# Patient Record
Sex: FEMALE | URBAN - METROPOLITAN AREA
[De-identification: names, ages, dates, MRNs, and addresses within clinical notes are randomized per-mention and may not be internally consistent; named-entity substitution may affect disease eponyms.]

---

## 2018-11-14 ENCOUNTER — IMPORTED ENCOUNTER (OUTPATIENT)
Dept: URBAN - METROPOLITAN AREA CLINIC 9 | Facility: CLINIC | Age: 43
End: 2018-11-14

## 2021-10-15 ASSESSMENT — VISUAL ACUITY
OD_SC: 20/20 SN
OS_SC: 20/20 SN

## 2021-10-15 ASSESSMENT — TONOMETRY
OS_IOP_MMHG: 17
OD_IOP_MMHG: 14

## 2022-03-09 NOTE — PATIENT DISCUSSION
Patient to discuss with  regarding cataract surgery.  Will refer when directed to Dr. Whitley Schuler per pt request.

## 2022-06-21 RX ORDER — ATORVASTATIN CALCIUM 20 MG/1
TABLET, FILM COATED ORAL
COMMUNITY

## 2022-07-27 NOTE — PATIENT DISCUSSION
Patient to discuss with  regarding cataract surgery.  Will refer when directed to Dr. Silvio Membreno per pt request.

## 2022-09-24 PROBLEM — R63.5 WEIGHT GAIN: Status: ACTIVE | Noted: 2022-09-24

## 2022-09-24 PROBLEM — F41.9 ANXIETY: Status: ACTIVE | Noted: 2022-09-24

## 2022-09-24 PROBLEM — Z90.710 S/P LAPAROSCOPIC ASSISTED VAGINAL HYSTERECTOMY (LAVH): Status: ACTIVE | Noted: 2022-09-24

## 2022-09-24 PROBLEM — R03.0 ELEVATED BLOOD PRESSURE READING WITHOUT DIAGNOSIS OF HYPERTENSION: Status: ACTIVE | Noted: 2022-09-24

## 2022-09-24 PROBLEM — G47.9 DIFFICULTY SLEEPING: Status: ACTIVE | Noted: 2022-09-24

## 2022-09-24 PROBLEM — E78.5 DYSLIPIDEMIA: Status: ACTIVE | Noted: 2022-09-24

## 2022-09-24 PROBLEM — I25.10 CORONARY ARTERY DISEASE INVOLVING NATIVE CORONARY ARTERY OF NATIVE HEART WITHOUT ANGINA PECTORIS: Status: ACTIVE | Noted: 2022-09-24

## 2022-09-24 PROBLEM — K51.90 ULCERATIVE COLITIS (HCC): Status: ACTIVE | Noted: 2022-09-24

## 2022-09-24 PROBLEM — E07.9 THYROID DISORDER: Status: ACTIVE | Noted: 2022-09-24

## 2022-09-24 PROBLEM — B37.31 VAGINAL MONILIASIS: Status: ACTIVE | Noted: 2022-09-24

## 2022-09-26 PROBLEM — R03.0 ELEVATED BLOOD PRESSURE READING WITHOUT DIAGNOSIS OF HYPERTENSION: Chronic | Status: ACTIVE | Noted: 2022-09-24

## 2022-09-26 PROBLEM — I25.10 CAD (CORONARY ARTERY DISEASE): Chronic | Status: ACTIVE | Noted: 2022-09-24

## 2022-09-26 PROBLEM — G47.9 DIFFICULTY SLEEPING: Chronic | Status: ACTIVE | Noted: 2022-09-24

## 2022-09-26 PROBLEM — R23.4 CRACKED SKIN ON FEET: Status: ACTIVE | Noted: 2022-09-26

## 2022-09-26 PROBLEM — E78.5 DYSLIPIDEMIA: Chronic | Status: ACTIVE | Noted: 2022-09-24

## 2022-09-26 PROBLEM — K51.90 ULCERATIVE COLITIS (HCC): Chronic | Status: ACTIVE | Noted: 2022-09-24

## 2022-09-26 PROBLEM — F41.9 ANXIETY: Chronic | Status: ACTIVE | Noted: 2022-09-24

## 2025-05-05 ENCOUNTER — NEW PATIENT (OUTPATIENT)
Age: 50
End: 2025-05-05

## 2025-05-05 DIAGNOSIS — H52.4: ICD-10-CM

## 2025-05-05 PROCEDURE — 92004 COMPRE OPH EXAM NEW PT 1/>: CPT

## 2025-05-05 PROCEDURE — 92015 DETERMINE REFRACTIVE STATE: CPT
